# Patient Record
(demographics unavailable — no encounter records)

---

## 2018-11-16 NOTE — RAD
CHEST 1 VIEW:

 

HISTORY: 

Injury.

 

COMPARISON: 

None.

 

FINDINGS: 

Lungs are without pneumothorax or effusion.  Well-defined round nodule projecting over the right mid 
lung measuring 1.2 cm.  This may be outside the patient and could represent a button.  

 

IMPRESSION: 

A 1.2 cm round density projecting in the right mid hemithorax may be outside of the patient and has t
he appearance of a button.  Recommend correlation for a button outside the patient.  If there is noth
ing extrinsic to the patient, a followup CT of the chest nonemergently may be beneficial.

 

POS: [facility]

## 2018-11-16 NOTE — RAD
INTRAOPERATIVE IMAGING OF THE RIGHT MIDDLE FINGER

11/16/18

 

COMPARISON:  

11/16/18

 

HISTORY: 

ORIF. 

 

FINDINGS:  

provided images demonstrate obliquely oriented fracture involving distal aspect of fourth proximal ph
alanx. There is also a fracture at the base of the third proximal phalanx. Percutaneous pins traverse
 the fracture involving the distal aspect of the fourth proximal phalanx and two screws traverse the 
fracture at the base of the third proximal phalanx. 

 

IMPRESSION:  

ORIF as above. 

 

POS: [facility]

## 2018-11-16 NOTE — RAD
RIGHT HAND 3 VIEWS:

 

Date:  11/16/18 

 

HISTORY:  

Cut hand on a bandsaw. 

 

COMPARISON:  

None. 

 

FINDINGS:

There is an open fracture of the middle finger proximal phalanx base, intraarticular, with volar disp
lacement of only half shaft width. There is also fracture of the ring finger proximal phalanx head an
d neck, intraarticular, with mild palmar angulation. 

 

IMPRESSION: 

1.  Intraarticular fractures of the middle and ring fingers as above. 

 

2.  Radiopaque debris along the medial aspect of the soft tissues of the small finger metatarsophalan
geal joint may be outside the patient. 

 

 

 

 

 

POS: [facility]

## 2018-11-19 NOTE — OP
DATE OF PROCEDURE:  11/16/2018

 

PREOPERATIVE DIAGNOSES:

1.  Right ring finger, open proximal phalanx neck fracture, intra-articular with bone loss.

2.  Extensor laceration, zones 3 and 4.

3.  Open proximal phalangeal joint.

4.  Intrinsic laceration.

5.  Digital nerve laceration.

 

POSTOP FINDINGS:

1.  Marked amount of soft tissue contamination of multiple particles to include fabric and dirt with 
minimal bone contamination.  Bone loss seen on the proximal aspect of the distal neck phalanx fractur
e/osteochondral time.

2.  At the right middle finger:

A.  Open proximal phalanx intra-articular fracture base of the proximal phalanx with intra-articular 
exposure.

B.  Extensive laceration.

C.  Open metacarpophalangeal joint.

D.  Digital nerve laceration.

E.  Intrinsic laceration.

 

PROCEDURES PERFORMED:

1.  At the right finger:

A.  Debridement of material associated with open fracture.

B.  Debridement of open joint wound.

C.  Debridement of wound, complex on both sides metacarpophalangeal joint.

D.  C-arm supervision.

 

2.  At the right middle finger:

A.  Debridement of material associated with open fracture.

B.  Debridement of deep wound and joint, metacarpophalangeal joint.

C.  Open reduction and internal fixation of proximal phalanx fracture.

D.  C-arm supervision.

 

SPECIMEN FINDINGS:  Multiple contaminations with cloth and particles and dirt.

 

ESTIMATED BLOOD LOSS:  20 mL.

 

TOURNIQUET TIME:  80 minutes.

 

FINAL FINDINGS:  Gross deep soft tissue contamination that was moderate and one small spot severe dwayne
ugh that we did not want to close the wound.  Bone contamination minimum, so we performed bone fixati
on to stabilize the area as well and circulation was intact at the end of procedure.  The tourniquet 
was deflated at both digits.

 

INDICATION:  The patient had a saw injury at approximately 08:30 at the day of admission and his work
 site was evacuated.  He underwent IV antibiotics with Dr. Yogesh Proctor evaluated.  The patient fe
lt, he had a grade 2 open fracture should be operated within 8 hours of the injury and this was accom
plished.  Intraoperatively, we discovered too much contamination to do definitive soft tissue coverag
e but had to do bone work in order to stabilize the area.

 

DESCRIPTION OF PROCEDURE:  After a successful timeout, the patient had the limb prepped and draped.  
Tourniquet was inflated at 200 mmHg pressure after exsanguination of the limb.  We then extended the 
incision 1 cm distal and proximal, but it was wide enough to incision, we had excellent visualization
.  We saw multiple dirt, particular matter, cold particles, and I saw the contamination of soft tissu
e, minimal contamination of the bone, so I have to debride the material associated with open fracture
s to include some marrow, debrided the joint of the metacarpophalangeal joint of the middle finger an
d the proximal phalangeal joint of the ring finger, we irrigated with 5 liters of normal saline and P
ulsavac pressure.  We then had to do more debridement of skin, soft tissue while the bone remained re
latively clean and we underwent another 5 liters of irrigation after this debridement.  Debridement i
ncluded excisional technique, combination of tenotomy scissors, small Crile, Adson pickups, and curet
dodie for instrumentation and depth was down to and including the bone where we could actually visualiz
ed deep the palmar surfaces on the inside of the web space between the two digital laceration with di
gital nerve injury to both sides of the third web space.  After this was accomplished, which was a se
cond debridement of all material, we then irrigated with 5 liters of normal saline, Pulsavac pressure
 with antibiotics inside and additional 5 liters without antibiotics.  Still we could not get a clean
 enough to feel safe to closing the bone joint, so we did not perform the definitive closure tendon w
ork or joint closure.

 

We then performed all these debridements, brought C-arm to the field, reduced the fracture on the bas
e of the middle finger, and then held in anatomic position with a lag screw technique using 1-5 screw
s.  C-arm confirm excellent position, no malrotation seen clinically.  We then performed a good reduc
tion that much smaller _____, was a slight osteochondral fracture from the running down the radial si
de of the index finger with marked malrotation.  After debridement was completed, using the same tech
nique used for the ring finger for long finger fracture, we planned to use the best fit method admit 
to get the joint reduced because of so much bone loss at the cortex making malrotation in the frontal
 plane highly likely.  Once we corrected for this, obtained adequate height, we decided to pin this w
ith a K-wire, as the bone edge was so small that it might not hold hold a screw.  Then, we finished t
he irrigation, inflated the tourniquet, and decided that the indications for closure were not there t
deon because the wound environment was not clean enough, so we decided to come back in 24 to 36 hours
 for debridement, irrigation, and definitive tendon nerve repair.

## 2018-11-20 NOTE — OP
PREOPERATIVE DIAGNOSES:  Right middle finger open fracture, proximal phalanx, right middle finger ope
n joint, metacarpophalangeal joint, right middle finger zone 4 extensor tendon laceration, right midd
le finger digital nerve laceration, total 10 cm wound entire hand and digits at the right ring finger
.

 

POSTOPERATIVE DIAGNOSES:  Right ring finger proximal phalanx distal third neck open fracture, right r
ing finger open proximal phalangeal joint, right ring finger extensor laceration, zone 3, right ring 
finger radial digital nerve and artery laceration, both wounds show minimal subcutaneous contaminatio
n as compared to yesterday and were nearly clean and could be made clean by debridement of the wound 
edges and removal of small amount of debris.  Minimum amount of debris found deep around the digital 
nerve laceration also removed.

 

PROCEDURES PERFORMED:

I.  A 10-cm wound closure and debridement of the wound using the following techniques:

    A.  Excisional technique.

    B.  Wound edges were debrided 1 mm circumferentially using a combination of Kidder blade, tenotom
y scissors, 11 blade knife,          Adson; subcutaneous and deep, we added a curet, as well as on th
e bone and the joint, we added a [place] as well as Adson           used to  individual debris s
een under magnification to include microscope.  

    C.  The depth was down to including the fracture on the proximal phalanx neck and the joint itsel
f.  Metacarpophalangeal 

        joint of the middle finger and proximal phalangeal joint of the ring finger and finally there
 was only minimal

        contamination described above.  

II.  Application of drain.

III.  Application of _____ splint. 

 

I. Ring finger procedure:

   A.  Debridement of material associated open fracture.

   B.  Debridement of open joint, proximal phalangeal joint.

   C.  Joint capsule repair.

   D.  Zone 4 extensor tendon repair.

   E.  Intrinsic repair.

   F:  Retinacular repair.

   G:  Microscopic digital nerve repair.

   H.  Microscopic digital nerve neuroplasty.

 

II.  Ring finger.

     A.  Debridement of open fracture, proximal mid phalanx neck at the proximal phalangeal joint.

     B.  Debridement of open proximal phalangeal joint.

     C.  Collateral ligament repair, radial aspect of the distal proximal phalanx.

     D.  Extensor tendon repair, zone 3.

     E.  Intrinsic repair.

     F.  Microscopic radial digital nerve repair.

     G.  Microscopic digital nerve neuroplasty.

 

TOURNIQUET TIME:  135 minutes (63 minutes inflated to 250 mmHg pressure, 20 minutes now completely an
d reinflation is 72 minutes at 250 mmHg pressure).

 

BLOOD LOSS:  20 mL

 

INJECTABLE:  30 mL total of 0.5% Marcaine _____ (20 before the incision was made and 10 at the end of
 the procedure once incision was closed).  The digit was pink with one second capillary refill of all
 digits include _____.

 

INDICATIONS:  Staged wound management after indeed fracture care was fairly definitive, except we did
 debride that area today and he had extensive debridement both yesterday and today because of contami
nation was brought back approximately 24 hours after surgery after getting IV antibiotics and rest.

 

DESCRIPTION OF PROCEDURE:  After successful general endotracheal, limb was prepped and draped.  A 10 
cm wound was evaluated, and he had too much bleeding to perform procedures with the tourniquet deflat
ed, so we exsanguinated the limb and inflated the tourniquet to 250 mmHg pressure.  We then injected 
the wound with 10 mL, then 5 mL of each of the metacarpophalangeal joint digital nerve blocks accompl
ished without abnormality.  We then began by debriding as listed above the skin and subcutaneous tiss
ue, then the tendon surfaces that were new surface 1 mm resection and then finally inspecting and kerry
riding the joint and periarticular region and the fracture at the ring finger, proximal phalanx neck 
as described (deep debridement) see above.  Then, once we have finished debridement of open fractures
 and debridement of joints as well as movement all material associated open fracture, we approached t
he fingers individually first with the middle finger.  We made extensor tendon laceration match, clos
ed the laceration with interrupted 3-0 Prolene figure-of-eight x5, then we repaired the intrinsics on
 the middle finger ulnar side which were cut using 4-0 nylon in an interrupted figure-of-eight patter
n as well as retinacular repair using figure-of-eight pattern Prolene 4-0.

 

Once we had done this, we kept the finger extended throughout this time.  It would be for the rest of
 procedure of the PIP joint.  We returned our attention to the ring finger where we already done the 
debridement of material associated open fracture as well as open joint and had finished the 5 L irrig
ation before we began by first repairing the collateral ligament which was a mid substance tear using
 multiple [id number] and 4-0 Prolene.  Then, we performed extensor tendon repair, zone 3 with a
 3-0 Prolene figure-of-eight interrupted using the same technique and we used on the other fingers, a
 zone 4 extensor tendon repair.  Now, we kept the finger in 0.  Intrinsics were repaired to include t
he terminal end of the oblique, lateral and sagittal bands and retinaculum did not have to be repaire
d on this side as had been done on the middle finger because of the mid to distal aspect of this, but
 the intrinsic was repaired.  Now, we deflated the tourniquet, obtained hemostasis.  We then began cl
osure which was accomplished except for placing the drain using interrupted 4-0 nylon in a simple pat
tern.  Placed a drain prior to  separate stab wound, but did not hook to reservoir because it had mor
e work to do.  We reinflated the tourniquet after exsanguinating the limb to 250 mmHg pressure.  At t
his time, we made a zigzag incision where we identified the digital nerve laceration to be about the 
webspace between the ring and long finger or more like the ulnar long finger and the radial ring fing
er.  Indeed, we made a zigzag incision to protect the neurovascular bundles on each side identified n
eurovascular bundles of the laceration were complete.  With a slight debridement of the edges of the 
nerves, we then approached the middle finger first, was able to repair the nerve with the PIP joint i
n approximately -15 degrees and full extension without undue tension, we straightened it and there wa
s no undue tension and did not need a conduit.  We then had used 8-0 Nurolon effectively with 4 sutur
es in the spot.

 

Microscope had been brought in the field to perform this digital nerve repair, we had to perform neur
oplasty of both the nerves and sure which one was cut and where it was cut to see if we will need rupert
t using nerve conduit because of the motorized nature of this injury.

 

The digital artery and nerve on the ring fingers were then undergo neuroplasty and dissection until w
e found the digital nerve laceration.  Then, under microscope, the neuroplasty completed under micros
cope, we placed 4 sutures in this nerve branch to accomplish admission of nerve repair microscopic.  
The microscope was removed, release of tourniquet, a final time, obtain hemostasis.  We then closed t
he incision over the hand, pinned and final debridement which was accomplished using interrupted 4-0 
nylon in a simple pattern.  The patient was taken to the operating room with pink digits 1 second ref
ill, palmar splint with the MP joints at -15 and PIP joints still and there is no evidence of anesthe
tic or operative complication.